# Patient Record
Sex: FEMALE | Race: WHITE | NOT HISPANIC OR LATINO
[De-identification: names, ages, dates, MRNs, and addresses within clinical notes are randomized per-mention and may not be internally consistent; named-entity substitution may affect disease eponyms.]

---

## 2023-02-23 DIAGNOSIS — Z78.9 OTHER SPECIFIED HEALTH STATUS: ICD-10-CM

## 2023-02-23 DIAGNOSIS — E66.9 OBESITY, UNSPECIFIED: ICD-10-CM

## 2023-02-23 DIAGNOSIS — Z85.828 PERSONAL HISTORY OF OTHER MALIGNANT NEOPLASM OF SKIN: ICD-10-CM

## 2023-02-23 DIAGNOSIS — Z82.61 FAMILY HISTORY OF ARTHRITIS: ICD-10-CM

## 2023-02-23 DIAGNOSIS — Z80.51 FAMILY HISTORY OF MALIGNANT NEOPLASM OF KIDNEY: ICD-10-CM

## 2023-02-23 DIAGNOSIS — Z63.5 DISRUPTION OF FAMILY BY SEPARATION AND DIVORCE: ICD-10-CM

## 2023-02-23 DIAGNOSIS — Z87.898 PERSONAL HISTORY OF OTHER SPECIFIED CONDITIONS: ICD-10-CM

## 2023-02-23 PROBLEM — Z00.00 ENCOUNTER FOR PREVENTIVE HEALTH EXAMINATION: Status: ACTIVE | Noted: 2023-02-23

## 2023-02-23 RX ORDER — ROSUVASTATIN CALCIUM 5 MG/1
5 TABLET, FILM COATED ORAL
Refills: 0 | Status: ACTIVE | COMMUNITY

## 2023-02-23 RX ORDER — ASCORBIC ACID 500 MG
500 CAPSULE, EXTENDED RELEASE ORAL
Refills: 0 | Status: ACTIVE | COMMUNITY

## 2023-02-23 RX ORDER — LEVOTHYROXINE SODIUM 25 UG/1
25 CAPSULE ORAL
Refills: 0 | Status: ACTIVE | COMMUNITY

## 2023-02-23 RX ORDER — MULTIVITAMIN
TABLET ORAL
Refills: 0 | Status: ACTIVE | COMMUNITY

## 2023-02-23 RX ORDER — CHROMIUM 200 MCG
400 TABLET ORAL
Refills: 0 | Status: ACTIVE | COMMUNITY

## 2023-02-23 SDOH — SOCIAL STABILITY - SOCIAL INSECURITY: DISRUPTION OF FAMILY BY SEPARATION AND DIVORCE: Z63.5

## 2023-02-24 ENCOUNTER — APPOINTMENT (OUTPATIENT)
Dept: HEMATOLOGY ONCOLOGY | Facility: CLINIC | Age: 78
End: 2023-02-24
Payer: MEDICARE

## 2023-02-24 ENCOUNTER — LABORATORY RESULT (OUTPATIENT)
Age: 78
End: 2023-02-24

## 2023-02-24 VITALS
HEIGHT: 62 IN | BODY MASS INDEX: 37.73 KG/M2 | SYSTOLIC BLOOD PRESSURE: 133 MMHG | HEART RATE: 64 BPM | RESPIRATION RATE: 18 BRPM | TEMPERATURE: 98.1 F | DIASTOLIC BLOOD PRESSURE: 79 MMHG | OXYGEN SATURATION: 97 % | WEIGHT: 205 LBS

## 2023-02-24 DIAGNOSIS — Z87.891 PERSONAL HISTORY OF NICOTINE DEPENDENCE: ICD-10-CM

## 2023-02-24 DIAGNOSIS — Z98.890 OTHER SPECIFIED POSTPROCEDURAL STATES: ICD-10-CM

## 2023-02-24 DIAGNOSIS — Z91.81 HISTORY OF FALLING: ICD-10-CM

## 2023-02-24 PROCEDURE — 36415 COLL VENOUS BLD VENIPUNCTURE: CPT

## 2023-02-24 PROCEDURE — 99213 OFFICE O/P EST LOW 20 MIN: CPT | Mod: 25

## 2023-02-24 RX ORDER — ACETAMINOPHEN/DIPHENHYDRAMINE 500MG-25MG
1000 TABLET ORAL
Refills: 0 | Status: ACTIVE | COMMUNITY

## 2023-02-27 NOTE — HISTORY OF PRESENT ILLNESS
[de-identified] : Ms. Baugh is a 77 year old female with a PMH of skin cancer SCCa, BCCa to R arm, hand and back, osteopenia, thyroid disease, that presents for initial consultation referred by PCP Alok Chris MD at [facility] for evaluation of thrombocytopenia. \par \par She presented to his office 1/10/23 for abd pain. Labs revealing low platelets. Repeated one month later  on 2/14/23. Patient denies any bleeding episodes, bleeding disorders, family history of bleeding disorders. \par \par She notes the recent change to her medical history is that she has been feeling off balance and has had falls. She is followed by neuro at [facility] Dr. Bonds. Notes MRI Head, C spine and Orbits was preformed. \par \par 9/2021 \par  \par \par 10/2022 \par  \par \par 1/2023 \par  \par \par 2/14/23\par \par \par No fever, rash, or recent illness.  No joint pain/swelling/stiffness.  No eye pain/redness/change in vision.  No sores in the mouth or nose.  No difficulty swallowing.  No chest pain or shortness of breath.  No abdominal complaints or weight loss.  No weakness.  No headaches or focal neurological deficits.  No urinary changes.  No other new symptoms.\par \par

## 2023-02-27 NOTE — ASSESSMENT
[FreeTextEntry1] : Ms. Baugh is a 77 year old female with a PMH of skin cancer SCCa, BCCa to R arm, hand and back, osteopenia, thyroid disease, that presents for initial consultation referred by PCP Alok Chris MD at [facility] for evaluation of thrombocytopenia. \par \par She presented to his office 1/10/23 for abd pain. Labs revealing low platelets. Repeated one month later  on 2/14/23. Patient denies any bleeding episodes, bleeding disorders, family history of bleeding disorders. \par \par ISolated thrombocytopenia\par Most likely ITP\par \par check Hep.serologies/HORTENSIA/RF/ESR/CRP/flow\par Instructed about CBC checks prior toprocedures\par \par RTC 6 months\par

## 2023-03-05 LAB
ANA SER IF-ACNC: NEGATIVE
HBV CORE IGG+IGM SER QL: NONREACTIVE
HBV SURFACE AB SERPL IA-ACNC: 171.1 MIU/ML
HBV SURFACE AG SER QL: NONREACTIVE
HCV AB SER QL: NONREACTIVE
HCV S/CO RATIO: 0.06 S/CO
RHEUMATOID FACT SER QL: <10 IU/ML
T4 FREE SERPL-MCNC: 1.2 NG/DL
TSH SERPL-ACNC: 4.17 UIU/ML

## 2023-08-14 DIAGNOSIS — M85.80 OTHER SPECIFIED DISORDERS OF BONE DENSITY AND STRUCTURE, UNSPECIFIED SITE: ICD-10-CM

## 2023-08-14 DIAGNOSIS — E78.5 HYPERLIPIDEMIA, UNSPECIFIED: ICD-10-CM

## 2023-08-14 DIAGNOSIS — E07.9 DISORDER OF THYROID, UNSPECIFIED: ICD-10-CM

## 2023-08-17 ENCOUNTER — RESULT REVIEW (OUTPATIENT)
Age: 78
End: 2023-08-17

## 2023-08-17 ENCOUNTER — APPOINTMENT (OUTPATIENT)
Dept: HEMATOLOGY ONCOLOGY | Facility: CLINIC | Age: 78
End: 2023-08-17
Payer: MEDICARE

## 2023-08-17 VITALS
SYSTOLIC BLOOD PRESSURE: 105 MMHG | BODY MASS INDEX: 35.15 KG/M2 | HEIGHT: 62 IN | TEMPERATURE: 97.6 F | WEIGHT: 191 LBS | HEART RATE: 61 BPM | RESPIRATION RATE: 18 BRPM | OXYGEN SATURATION: 98 % | DIASTOLIC BLOOD PRESSURE: 69 MMHG

## 2023-08-17 DIAGNOSIS — K86.2 CYST OF PANCREAS: ICD-10-CM

## 2023-08-17 PROCEDURE — 99213 OFFICE O/P EST LOW 20 MIN: CPT | Mod: 25

## 2023-08-17 PROCEDURE — 36415 COLL VENOUS BLD VENIPUNCTURE: CPT

## 2023-08-17 NOTE — ASSESSMENT
[FreeTextEntry1] : Ms. Baugh is a 77 year old female with a PMH of skin cancer SCCa, BCCa to R arm, hand and back, osteopenia, thyroid disease, that presents for initial consultation referred by PCP Alok Chris MD at [facility] for evaluation of thrombocytopenia.   She presented to his office 1/10/23 for abd pain. Labs revealing low platelets. Repeated one month later  on 2/14/23. Patient denies any bleeding episodes, bleeding disorders, family history of bleeding disorders.   ISolated thrombocytopenia Most likely ITP Monitor   PAncreatic cyst -- f/u jeevan Becker  EUS 7/23 ---nondiagnostic  FNA, amylase > 100,000 ? cystademnoma . CEA <5--4 MRI 10/23 colonoscopy 3 polyps --8/23 upto date mammogram  LEft lower rib cage pain x  6-7 months --? XRay ? MRI T spine/CT chest if persistent  RTC 6 months

## 2023-08-17 NOTE — HISTORY OF PRESENT ILLNESS
[de-identified] : Ms. Baugh is a 77 year old female with a PMH of skin cancer SCCa, BCCa to R arm, hand and back, osteopenia, thyroid disease, that presents for initial consultation referred by PCP Alok Chris MD at [facility] for evaluation of thrombocytopenia. \par  \par  She presented to his office 1/10/23 for abd pain. Labs revealing low platelets. Repeated one month later  on 2/14/23. Patient denies any bleeding episodes, bleeding disorders, family history of bleeding disorders. \par  \par  She notes the recent change to her medical history is that she has been feeling off balance and has had falls. She is followed by neuro at [facility] Dr. Bonds. Notes MRI Head, C spine and Orbits was preformed. \par  \par  9/2021 \par   \par  \par  10/2022 \par   \par  \par  1/2023 \par   \par  \par  2/14/23\par  \par  \par  No fever, rash, or recent illness.  No joint pain/swelling/stiffness.  No eye pain/redness/change in vision.  No sores in the mouth or nose.  No difficulty swallowing.  No chest pain or shortness of breath.  No abdominal complaints or weight loss.  No weakness.  No headaches or focal neurological deficits.  No urinary changes.  No other new symptoms.\par  \par   [de-identified] : s/p EUS 7/10/23 : esophagus/stomach/duodenum --nl. 2.1 x 1.3 cm cyst in pancreatic tail with connection to the duct.  FNA : nondiagnostic   Lost 10lbs intentionally  occasional left lower rib cage pain in the back --- 2x /week

## 2023-09-16 LAB
CANCER AG19-9 SERPL-ACNC: 5 U/ML
GGT SERPL-CCNC: 12 U/L

## 2024-03-20 ENCOUNTER — RESULT REVIEW (OUTPATIENT)
Age: 79
End: 2024-03-20

## 2024-03-20 ENCOUNTER — LABORATORY RESULT (OUTPATIENT)
Age: 79
End: 2024-03-20

## 2024-03-20 ENCOUNTER — APPOINTMENT (OUTPATIENT)
Dept: HEMATOLOGY ONCOLOGY | Facility: CLINIC | Age: 79
End: 2024-03-20
Payer: MEDICARE

## 2024-03-20 VITALS
DIASTOLIC BLOOD PRESSURE: 64 MMHG | BODY MASS INDEX: 33.49 KG/M2 | SYSTOLIC BLOOD PRESSURE: 100 MMHG | TEMPERATURE: 97.8 F | HEIGHT: 62 IN | WEIGHT: 182 LBS | RESPIRATION RATE: 18 BRPM | HEART RATE: 61 BPM | OXYGEN SATURATION: 98 %

## 2024-03-20 DIAGNOSIS — D69.6 THROMBOCYTOPENIA, UNSPECIFIED: ICD-10-CM

## 2024-03-20 PROCEDURE — 36415 COLL VENOUS BLD VENIPUNCTURE: CPT

## 2024-03-20 PROCEDURE — 99213 OFFICE O/P EST LOW 20 MIN: CPT

## 2024-03-20 NOTE — REASON FOR VISIT
[Initial Consultation] : an initial consultation for [Follow-Up Visit] : a follow-up visit for [FreeTextEntry2] : thrombocytopenia

## 2024-03-20 NOTE — ASSESSMENT
[FreeTextEntry1] : Ms. Baugh is a 78 year old female with a PMH of skin cancer SCCa, BCCa to R arm, hand and back, osteopenia, thyroid disease, that presents for initial consultation referred by PCP Alok Chris MD at [facility] for evaluation of thrombocytopenia.   She presented to his office 1/10/23 for abd pain. Labs revealing low platelets. Repeated one month later  on 2/14/23. Patient denies any bleeding episodes, bleeding disorders, family history of bleeding disorders.   Isolated thrombocytopenia Most likely ITP Follow up with PCP Platelets clumping  PAncreatic cyst -- f/u at [facility] EUS 7/23 ---nondiagnostic  FNA, amylase > 100,000 ? cystademnoma . CEA <5--4 MRI 10/23 Following with [facility]  colonoscopy 3 polyps --8/23 upto date mammogram  Thyroid nodules- needs FNA  LEft lower rib cage pain x  6-7 months --? XRay ? MRI T spine/CT chest if persistent  follow up PRN

## 2024-03-20 NOTE — HISTORY OF PRESENT ILLNESS
[de-identified] : s/p EUS 7/10/23 : esophagus/stomach/duodenum --nl. 2.1 x 1.3 cm cyst in pancreatic tail with connection to the duct.  FNA : nondiagnostic- following with [facility].    continues to lose weight- however intentional.   Has some voice changes- states she has a 1.8cm lesion- due for biopsy    [de-identified] : Ms. Baugh is a 77 year old female with a PMH of skin cancer SCCa, BCCa to R arm, hand and back, osteopenia, thyroid disease, that presents for initial consultation referred by PCP Alok Chris MD at [facility] for evaluation of thrombocytopenia.   She presented to his office 1/10/23 for abd pain. Labs revealing low platelets. Repeated one month later  on 2/14/23. Patient denies any bleeding episodes, bleeding disorders, family history of bleeding disorders.   She notes the recent change to her medical history is that she has been feeling off balance and has had falls. She is followed by neuro at [facility] Dr. Bonds. Notes MRI Head, C spine and Orbits was preformed.   9/2021     10/2022     1/2023     2/14/23   No fever, rash, or recent illness.  No joint pain/swelling/stiffness.  No eye pain/redness/change in vision.  No sores in the mouth or nose.  No difficulty swallowing.  No chest pain or shortness of breath.  No abdominal complaints or weight loss.  No weakness.  No headaches or focal neurological deficits.  No urinary changes.  No other new symptoms.

## 2024-12-04 ENCOUNTER — TRANSCRIPTION ENCOUNTER (OUTPATIENT)
Age: 79
End: 2024-12-04